# Patient Record
Sex: MALE | Employment: OTHER | URBAN - METROPOLITAN AREA
[De-identification: names, ages, dates, MRNs, and addresses within clinical notes are randomized per-mention and may not be internally consistent; named-entity substitution may affect disease eponyms.]

---

## 2024-09-25 ENCOUNTER — OFFICE VISIT (OUTPATIENT)
Dept: AUDIOLOGY | Age: 74
End: 2024-09-25
Payer: COMMERCIAL

## 2024-09-25 DIAGNOSIS — H90.3 SENSORY HEARING LOSS, BILATERAL: Primary | ICD-10-CM

## 2024-09-25 PROCEDURE — 92557 COMPREHENSIVE HEARING TEST: CPT

## 2024-09-25 PROCEDURE — 92567 TYMPANOMETRY: CPT

## 2024-09-25 NOTE — PROGRESS NOTES
Progress Note    Name:  Karlos Arevalo  :  1950  Age:  74 y.o.  MRN:  [id number]  Date of Evaluation: 24     Patient is here for a hearing aid evaluation. Patient has a [employer] hearing benefit. Patient notes being unable to pay out of pocket. Patient was advised to contact [facility] to see where he can use his hearing aid benefit.    He was encouraged to contact the office with any questions or concerns.        Shawn Watts., [facility]  Clinical Audiologist

## 2024-09-25 NOTE — PROGRESS NOTES
Diagnostic Hearing Evaluation    Name:  Karlos Arevalo  :  1950  Age:  74 y.o.   MRN:  [id number]  Date of Evaluation: 24     HISTORY:     Reason for visit: Difficulty Understanding    Karlos Arevalo is being seen today at the request of Dr. Baires for an initial  evaluation of hearing. The patient reports a longstanding difficulty understanding. He notes a history of noise exposure. The patient  denies otalgia, otorrhea, dizziness, fullness, and family history of hearing loss.     EVALUATION:    Otoscopic Evaluation:   Right Ear: Unremarkable, canal clear   Left Ear: Unremarkable, canal clear    Tympanometry:   Right Ear: Type A; normal middle ear pressure and static compliance    Left Ear: Type A; normal middle ear pressure and static compliance     Speech Audiometry:  Speech Reception (SRT)    Right Ear: 45 dB HL    Left Ear: 55 dB HL    Word Recognition Scores (WRS):  Right Ear: fair (76 % correct)     Left Ear: fair (72 % correct)    Stimuli: [id number]    Pure Tone Audiometry:  Conventional pure tone audiometry from 250 - 8000 Hz  was obtained with good reliability and revealed the following:     Right Ear: Moderate sloping to severe sensorineural hearing loss (SNHL)    Left Ear: Moderate sloping to severe sensorineural hearing loss (SNHL)     *see attached audiogram    RECOMMENDATIONS:  Annual hearing eval, Return to [facility]. for F/U, Hearing Aid Evaluation, and Copy to Patient/Caregiver    PATIENT EDUCATION:   The results of today's results and recommendations were reviewed with the patient and his hearing thresholds were explained at length. Treatment options, including amplification and communication strategies, were discussed as appropriate. The patient voiced understanding of his test results. Questions were addressed and the patient was encouraged to contact our department should concerns arise.      Shawn Watts., [facility]  Clinical Audiologist  [facility] AUDIOLOGY & HEARING  [facility]  [address] [place] [id number]